# Patient Record
(demographics unavailable — no encounter records)

---

## 2024-12-12 NOTE — PHYSICAL EXAM
[Discharge in canal] : discharge in canal [Pain with manipulation of pinna] : pain with manipulation of pinna [Erythema of canal] : erythema of canal [Left] : (left) [Clear] : right tympanic membrane clear [NL] : warm, clear

## 2024-12-12 NOTE — DISCUSSION/SUMMARY
[FreeTextEntry1] : MOC asking for PO Abx. explained that not indicated based on exam findings and history. MOC states that child not cooperative with drops bc of pain. REC: 400mg ibuprofen then 30-45 minutes after administer the drops. counseled to call office if child develops fever. Questions answered, parent expresses understanding of plan.

## 2024-12-12 NOTE — HISTORY OF PRESENT ILLNESS
[de-identified] : L ear pain [FreeTextEntry6] : low grade fever 100.2 and ear pain denies cough/congestion was really bad last night mom said similar symptoms last summer, went to  and was given PO abx

## 2025-02-07 NOTE — CONSULT LETTER
[Dear  ___] : Dear  [unfilled], [Consult Letter:] : I had the pleasure of evaluating your patient, [unfilled]. [Please see my note below.] : Please see my note below. [Consult Closing:] : Thank you very much for allowing me to participate in the care of this patient.  If you have any questions, please do not hesitate to contact me. [Sincerely,] : Sincerely, [FreeTextEntry3] : Angelique Granger MD

## 2025-02-07 NOTE — HISTORY OF PRESENT ILLNESS
[Premenarchal] : premenarchal [FreeTextEntry2] : Brooke is a 13 year 1 month old F referred by her pediatrician for an initial evaluation of elevated TSH on laboratory testing. Mother explains that patient has been having body aches and joint pains for the past two years, and it has been persistent. She points to her arms with both wrists, and legs with both knees, and also her back in general to describe the discomfort. No joint swelling or redness, no muscle spasms, no limping. Mother says she always complains of fatigue, mother noticed worse over the last year. She intermittently has headaches, pointing to front of her forehead, for which she occasionally takes Motrin or Tylenol. She reports sleeping well. She denies abdominal pain, she reports bowel movement every other day, occasionally needing MiraLAX for constipation. No weight loss. She notes breast development starting 1 year ago, no menarche. She was evaluated by Orthopedics in the past, recommended PT, they have a f/u appointment this Month. She has not been referred to any other specialist yet.   Records reviewed consist of laboratory testing from 2/3/2025 showing mildly above range TSH of 5.89 uIU/ml, FT4 1.3 ng/dl.  No family history of thyroid problems, no known family history of autoimmune conditions in general.

## 2025-02-07 NOTE — PHYSICAL EXAM
[Healthy Appearing] : healthy appearing [Well Nourished] : well nourished [Interactive] : interactive [Normal Appearance] : normal appearance [Well formed] : well formed [Normally Set] : normally set [Normal S1 and S2] : normal S1 and S2 [Clear to Ausculation Bilaterally] : clear to auscultation bilaterally [Abdomen Soft] : soft [Abdomen Tenderness] : non-tender [] : no hepatosplenomegaly [Normal] : grossly intact [Murmur] : no murmurs [de-identified] : Thyroid gland is barely palpablem, no enlargement

## 2025-02-07 NOTE — REVIEW OF SYSTEMS
[Nl] : Genitourinary [Change in Activity] : change in activity [Joint Pains] : arthralgias [Back Pain] : ~T back pain [Headache] : headache [Sleep Disturbances] : ~T no sleep disturbances [Fainting] : no fainting [Seizure] : no seizures

## 2025-02-07 NOTE — PHYSICAL EXAM
[Healthy Appearing] : healthy appearing [Well Nourished] : well nourished [Interactive] : interactive [Normal Appearance] : normal appearance [Well formed] : well formed [Normally Set] : normally set [Normal S1 and S2] : normal S1 and S2 [Clear to Ausculation Bilaterally] : clear to auscultation bilaterally [Abdomen Soft] : soft [] : no hepatosplenomegaly [Abdomen Tenderness] : non-tender [Normal] : normal  [Murmur] : no murmurs [de-identified] : Thyroid gland is barely palpablem, no enlargement

## 2025-02-10 NOTE — IMMUNIZATIONS
[Immunizations are up to date] : Immunizations are up to date [Records maintained by [facility]] : Records maintained by [facility]

## 2025-02-10 NOTE — CONSULT LETTER
[Dear  ___] : Dear  [unfilled], [Courtesy Letter:] : I had the pleasure of seeing your patient, [unfilled], in my office today. [Please see my note below.] : Please see my note below. [Consult Closing:] : Thank you very much for allowing me to participate in the care of this patient.  If you have any questions, please do not hesitate to contact me. [Sincerely,] : Sincerely, [FreeTextEntry2] : Rosana Gunderson  [facility] [address] [place] [address] [FreeTextEntry3] : Liliana Jefferson DO Attending Physician, Pediatric Rheumatology [facility] | [facility]

## 2025-02-10 NOTE — REVIEW OF SYSTEMS
[NI] : Endocrine [Nl] : Hematologic/Lymphatic [Date of Last Ophto Exam: _____] : the patient's last Ophthalmology exam was [unfilled] [Joint Pains] : arthralgias [Back Pain] : ~T back pain [Appropriate Age Development] : development appropriate for age [Muscle Aches] : muscle aches [Upper Back Pain] : upper back pain [Menarche] : no ~T menarche [Limping] : no limping [Joint Swelling] : no joint swelling [AM Stiffness] : no am stiffness

## 2025-02-10 NOTE — PHYSICAL EXAM
[PERRLA] : CJ [Eyelids] : normal eyelids [Pupils] : pupils were equal and round [Gums] : normal gums [Mucosa] : moist and pink mucosa [Palate] : normal palate [S1, S2 Present] : S1, S2 present [Cardiac Auscultation] : normal cardiac auscultation  [Respiratory Effort] : normal respiratory effort [Clear to auscultation] : clear to auscultation [Soft] : soft [NonTender] : non tender [Non Distended] : non distended [Normal Bowel Sounds] : normal bowel sounds [No Hepatosplenomegaly] : no hepatosplenomegaly [No Abnormal Lymph Nodes Palpated] : no abnormal lymph nodes palpated [Muscle Strength] : normal muscle strength [Range Of Motion] : full range of motion [Gait] : normal gait [Intact Judgement] : intact judgement  [Insight Insight] : intact insight [Thumbs bend back to reach forearm] : thumbs bend back to reach forearm [Hyperextension of elbows] : hyperextension of elbows  [Hyperextension of knees] : hyperextension of knees [Pronated flat feet] : pronated flat feet [Acute distress] : no acute distress [Rash] : no rash [Malar Erythema] : no malar erythema [Erythematous Conjunctiva] : nonerythematous conjunctiva [Erythematous Oropharynx] : nonerythematous oropharynx [Lesions] : no lesions [Murmurs] : no murmurs [Peripheral Edema] : no peripheral edema  [Joint effusions] : no joint effusions [NumbJointsActiveArthritis] : 0 [NumbJointsLimitedMotion] : 0 [de-identified] : FROM C-spine; no pain or tenderness to palpation [de-identified] : no pain or tenderness to palpation [de-identified] : no pain or tenderness to palpation [de-identified] : negative FABERE bilaterally; no pain or tenderness to palpation.no [de-identified] : none [de-identified] : full forward flexion [de-identified] : no gross discrepancy

## 2025-02-10 NOTE — HISTORY OF PRESENT ILLNESS
[Unlimited ADLs] : able to do activities of daily living without limitations [FreeTextEntry1] : Brooke is a 14yo girl referred for 'all over joint pain' x2 years.  Pain in legs and arms randomly throughout the day. Knees, legs, wrists, and back/shoulders. No AM stiffness, but sometimes stiffness throughout the day. No swelling or limping. Feels tired and fatigued all the time. Able to do ADLs, but when everything hurts sometimes is slow to do some things. Goes around slowly in school because everything hurts. Sometimes resting helps. Motrin PRN - lessens pain but does not go away. Wears backpack in school, has locker and uses it but Mom thinks backpack is heavy. Feels the same on weekends. Always sitting on couch/bed per Mom. Doesn't like doing the stairs because of all her pain.   Has appt with PT today (for back); has appt with ortho this Wednesday for f/u.  Also gets headaches. Has one right now, gets one every day. Does have glasses - seen by eye doctor last year so due for an appt. Sometimes feels nauseous, does not have any vomiting with headaches. No neck pain. +photophobia and +phonophobia. Headache is frontal. Drinks ~3 cups of water a day. Appetite normal.   Takes piano and singing lessons and says feels tired and doesn't want to go to these lessons. (does like to attend these classes, playing piano since 6yo).   Constipated.   This winter had ear infections and swimmer's ear (4x). Last infection was one month ago. Ear pain has resolved. Hearing is normal.   Seen by podiatrist in the past; prescribed orthotics but never wore them.   Seen by PMD and had blood tests done. Thyroid was abnormal. Not sure what else was tested. Does not have copy of results. Seen by endo last week.   Denies any recent illnesses, fevers, rashes, oral lesions, Raynaud's, vision changes, dizziness/lightheadedness, chest pain, difficulty breathing, abdominal pain, n/v/d, hematuria, hematochezia, weakness, or peripheral edema.  [Rheumatoid Arthritis] : no Rheumatoid Arthritis [Juvenile Rheumatoid Arthritis] : no Juvenile Rheumatoid Arthritis [Psoriasis] : no Psoriasis [Diabetes Mellitus (type 1 - insulin dependent)] : no Type 1 Diabetes Mellitus [Systemic Lupus Erythematosus] : no Systemic Lupus Erythematosus [IBD - Crohns] : no Crohn's Inflammatory Bowel disease [IBD - Ulcerative Colitis] : no Ulcerative Colitis Inflammatory Bowel Disease [Graves' Disease] : no Graves' Disease [Hashimoto's Thyroiditis] : no Hashimoto's Thyroiditis [Multiple Sclerosis] : no Multiple Sclerosis [de-identified] : Paternal Aunt - maybe has Sjogren's (has dry mouth, Mom not sure of details)

## 2025-02-10 NOTE — SOCIAL HISTORY
[Mother] : mother [Father] : father [Grade:  _____] : Grade: [unfilled] [de-identified] : Dog  [FreeTextEntry1] : Plays piano; does kendra

## 2025-02-13 NOTE — PHYSICAL EXAM
[FreeTextEntry1] : Healthy appearing 13 year-old child. Awake, alert, in no acute distress. Pleasant and cooperative.  Eyes are clear with no sclera abnormalities. External ears, nose and mouth are clear.  Good respiratory effort with no audible wheezing without use of a stethoscope. Ambulates independently with no evidence of antalgia. Good coordination and balance. Able to get on and off exam table without difficulty.   MSK: Bilateral upper extremities are grossly symmetrical with normal alignment and full ROM Mild LLD with R>L by about 1cm. Patient has full range of motion of both the hips, knees, ankles, wrists, elbows, and shoulders. Neck range of motion is full and free without any pain or spasm. No obvious deformity or swelling. Sensation to light touch in the upper and lower extremities normal. WWP Distally, brisk cap refill.  Standing Evaluation: The head and shoulders are level over the pelvis. No asymmetry of back structures when standing. Forward bend does not reveal any asymmetry. No pain with palpation of the spinous processes. Patient is able to ROM back forward, backward, side to side, with no discomfort with ROM. Mild postural kyphosis, fully correctable on hyperextension. pain with hyperextension of her back  BL feet: No TTP along medial arch Present of arch with sitting Supple subtalar motion + flatfoot with weight bearing + too many toes sign with weight bearing Hindfoot valgus corrects to hindfoot varus with heel rise

## 2025-02-13 NOTE — ASSESSMENT
[FreeTextEntry1] : 13F with mild LLD R>L 1cm, spinal asymmetry and low back pain and flexible flat feet.  The history was obtained today from the child and parent; given the patient's age and/or the child's mental capacity, the history was unreliable and the parent was used as an independent historian. The condition, natural history, and prognosis were explained to the patient and family. The clinical findings and images were reviewed with the family.   02/12/25: XR spine AP/Lat views obtained and independently reviewed in our office today: spinal asymmetry with curve <10 degrees. Pelvic obliquity R higher than L by about 13 mm. Normal lordotic/kyphotic curvature. There are no signs of Scheuermann's kyphosis or wedging. No signs of spondylolysis or spondylolisthesis.  The patient and family were educated regarding pes planovalgus or flexible flat feet. There is no orthopedic concern at this time. Over the counter medical arch supports may be used in feet that are painful. However they are not recommended in asymptomatic feet as they may make a non-painful foot painful. If she does well with medial arch supports and would like additional support, a script for UCBL's may be provided. The family was educated that orthopedic inserts do not change the flexibility of the arch, rather provide support when being used. She may continue all activities as tolerated.  With regards to her back pain, I am recommending a course of physical therapy.  Recommendations for exercises at least 3 times a week.  She may take Tylenol Motrin as needed for pain.  Ice and heat as needed for pain.  Follow-up in 6 weeks for repeat evaluation, possible MRI if no improvement of her back pain.  All questions were answered, the family expresses understanding and agrees with the plan of care.   This note was generated using Dragon medical dictation software. A reasonable effort has been made for proofreading its contents, but typos may still remain. If there are any questions or points of clarification needed please do not hesitate to contact my office.

## 2025-02-13 NOTE — DATA REVIEWED
[de-identified] : 02/12/25: XR spine AP/Lat views obtained and independently reviewed in our office today: spinal asymmetry with curve <10 degrees. Pelvic obliquity R higher than L by about 13 mm. Normal lordotic/kyphotic curvature. There are no signs of Scheuermann's kyphosis or wedging. No signs of spondylolysis or spondylolisthesis.  05/22/2024 : XR spine AP/Lat views obtained and independently reviewed in our office today: spinal asymmetry with curve <10 degrees. Pelvic obliquity R higher than L by about 17mm.  Normal lordotic/kyphotic curvature. There are no signs of Scheuermann's kyphosis or wedging. No signs of spondylolysis or spondylolisthesis.

## 2025-02-13 NOTE — REASON FOR VISIT
[Patient] : patient [Mother] : mother [FreeTextEntry1] : low back pain, spinal asymmetry and LLD R>L

## 2025-02-13 NOTE — DATA REVIEWED
[de-identified] : 02/12/25: XR spine AP/Lat views obtained and independently reviewed in our office today: spinal asymmetry with curve <10 degrees. Pelvic obliquity R higher than L by about 13 mm. Normal lordotic/kyphotic curvature. There are no signs of Scheuermann's kyphosis or wedging. No signs of spondylolysis or spondylolisthesis.  05/22/2024 : XR spine AP/Lat views obtained and independently reviewed in our office today: spinal asymmetry with curve <10 degrees. Pelvic obliquity R higher than L by about 17mm.  Normal lordotic/kyphotic curvature. There are no signs of Scheuermann's kyphosis or wedging. No signs of spondylolysis or spondylolisthesis.

## 2025-02-13 NOTE — HISTORY OF PRESENT ILLNESS
[FreeTextEntry1] : Brooke is a 13 year old female who returns to our office today for follow up of low back pain, spinal asymmetry and R>L LLD. She was seen in our office on May 22, 2024 where we recommended a course of PT for suspected muscular back pain.   She comes in today with mom.  Of note she is not participated in physical therapy since her last office visit.  She reports that her pain has increased since her last office visit.  She has pain every day in her upper and lower back.  It is worse when she carries a backpack when her pain improves when she lays down and rests.  She takes Motrin occasionally for the pain.  She was seen by an endocrinologist due to a slightly abnormal thyroid level, and was told to follow-up in 6 months.  She was also seen by rheumatology due to her chronic back pain where additional lab work was taken and was within normal limits.  The rheumatologist recommended consultation with a podiatrist for her flatfeet.

## 2025-02-27 NOTE — DISCUSSION/SUMMARY
[FreeTextEntry1] :  13 year old with fatigue, constipation, headaches, and ongoing joint pains.  Advised to start physical therapy as advised by orthopedics.  suggested assessment with neurology due to chronic pain and headaches.  will send labs for celiac disease due to constipation and picky eating. can continue miralax as needed.  will follow by phone with lab results plan to follow up for well care in May, sooner as needed.

## 2025-02-27 NOTE — HISTORY OF PRESENT ILLNESS
[de-identified] : fatigue, joint pain [FreeTextEntry6] : Mother reports that patient starting having body pains and joint pains that began 2 years ago.  Recently she has been very fatigued and having pain climbing stairs. She has seen endocrinology (2/5/25), rheumatology (2/10/25) and orthopedics (2/12/25) this month. All specialist notes reviewed. currently the pain is worse in the right knee but the pain tends to move around between her knees, backs, shoulder.  the pain is "jolting pain" that last about 10 minutes.  sometimes is goes away on its own and sometimes she takes Motrin.  She is not using ibuprofen daily.  On average 2-3 days a week she uses the ibuprofen.  very rarely she wakes up at night with back pain. She generally falls asleep OK.   She is always fatigued.  She is always a picky eater, she is generally constipated.  She is stooling every other days, sometimes with miralax. She takes vitamin C and a teen gummy multivitamin.  Sometimes she gets bad headaches, sometimes the left ear hurts.

## 2025-02-27 NOTE — HISTORY OF PRESENT ILLNESS
[de-identified] : fatigue, joint pain [FreeTextEntry6] : Mother reports that patient starting having body pains and joint pains that began 2 years ago.  Recently she has been very fatigued and having pain climbing stairs. She has seen endocrinology (2/5/25), rheumatology (2/10/25) and orthopedics (2/12/25) this month. All specialist notes reviewed. currently the pain is worse in the right knee but the pain tends to move around between her knees, backs, shoulder.  the pain is "jolting pain" that last about 10 minutes.  sometimes is goes away on its own and sometimes she takes Motrin.  She is not using ibuprofen daily.  On average 2-3 days a week she uses the ibuprofen.  very rarely she wakes up at night with back pain. She generally falls asleep OK.   She is always fatigued.  She is always a picky eater, she is generally constipated.  She is stooling every other days, sometimes with miralax. She takes vitamin C and a teen gummy multivitamin.  Sometimes she gets bad headaches, sometimes the left ear hurts.

## 2025-02-27 NOTE — PHYSICAL EXAM
[NL] : warm, clear [de-identified] : 5/5 strength, no swelling or restriction range of motion at knees.

## 2025-02-27 NOTE — PHYSICAL EXAM
[NL] : warm, clear [de-identified] : 5/5 strength, no swelling or restriction range of motion at knees.

## 2025-04-02 NOTE — ASSESSMENT
[FreeTextEntry1] : 13F with mild LLD R>L 1cm, spinal asymmetry and low back pain and flexible flat feet.  The history was obtained today from the child and parent; given the patient's age and/or the child's mental capacity, the history was unreliable and the parent was used as an independent historian. The condition, natural history, and prognosis were explained to the patient and family. The clinical findings and images were reviewed with the family.   02/12/25: XR spine AP/Lat views obtained and independently reviewed in our office today: spinal asymmetry with curve <10 degrees. Pelvic obliquity R higher than L by about 13 mm. Normal lordotic/kyphotic curvature. There are no signs of Scheuermann's kyphosis or wedging. No signs of spondylolysis or spondylolisthesis.  The patient and family were educated regarding pes planovalgus or flexible flat feet. There is no orthopedic concern at this time. Over the counter medical arch supports may be used in feet that are painful. However they are not recommended in asymptomatic feet as they may make a non-painful foot painful. If she does well with medial arch supports and would like additional support, a script for UCBL's may be provided. The family was educated that orthopedic inserts do not change the flexibility of the arch, rather provide support when being used. She may continue all activities as tolerated.  With regards to her back pain, her pain is slowly improving.  She is already 30% better.  Recommendation is to continue with physical therapy.  A new prescription was provided.  Once her course of physical therapy has been completed, she should continue home exercises for maintenance therapy.   F/u in 4 months for repeat scoliosis and leg length x-rays.  All questions were answered, the family expresses understanding and agrees with the plan of care.   This note was generated using Dragon medical dictation software. A reasonable effort has been made for proofreading its contents, but typos may still remain. If there are any questions or points of clarification needed please do not hesitate to contact my office.

## 2025-04-02 NOTE — REASON FOR VISIT
[Patient] : patient [FreeTextEntry1] : low back pain, spinal asymmetry and LLD R>L [Father] : father

## 2025-04-02 NOTE — DATA REVIEWED
[de-identified] : 02/12/25: XR spine AP/Lat views obtained and independently reviewed in our office today: spinal asymmetry with curve <10 degrees. Pelvic obliquity R higher than L by about 13 mm. Normal lordotic/kyphotic curvature. There are no signs of Scheuermann's kyphosis or wedging. No signs of spondylolysis or spondylolisthesis.  05/22/2024 : XR spine AP/Lat views obtained and independently reviewed in our office today: spinal asymmetry with curve <10 degrees. Pelvic obliquity R higher than L by about 17mm.  Normal lordotic/kyphotic curvature. There are no signs of Scheuermann's kyphosis or wedging. No signs of spondylolysis or spondylolisthesis.

## 2025-04-02 NOTE — HISTORY OF PRESENT ILLNESS
[FreeTextEntry1] : Brooke is a 13 year old female who returns to our office today for follow up of low back pain, spinal asymmetry and R>L LLD. She was seen in our office on May 22, 2024 where we recommended a course of PT for suspected muscular back pain.   2/12/25: Of note she is not participated in physical therapy since her last office visit.  She reports that her pain has increased since her last office visit.  She has pain every day in her upper and lower back.  It is worse when she carries a backpack when her pain improves when she lays down and rests.  She takes Motrin occasionally for the pain.  She was seen by an endocrinologist due to a slightly abnormal thyroid level, and was told to follow-up in 6 months.  She was also seen by rheumatology due to her chronic back pain where additional lab work was taken and was within normal limits.  The rheumatologist recommended consultation with a podiatrist for her flatfeet.  4/2/25: She comes in today with her dad.  She has been going to physical therapy 2 days a week for since her last visit.  She reports that she is 30% better.  She still has pain in her lower back region primarily at the beginning of the day and at the end of the day, but does not feel pain in the middle of the day.